# Patient Record
Sex: MALE | Race: WHITE | NOT HISPANIC OR LATINO | Employment: OTHER | URBAN - METROPOLITAN AREA
[De-identification: names, ages, dates, MRNs, and addresses within clinical notes are randomized per-mention and may not be internally consistent; named-entity substitution may affect disease eponyms.]

---

## 2021-01-15 DIAGNOSIS — Z23 NEED FOR VACCINATION: ICD-10-CM

## 2021-07-21 ENCOUNTER — HOSPITAL ENCOUNTER (OUTPATIENT)
Dept: RADIOLOGY | Facility: MEDICAL CENTER | Age: 76
End: 2021-07-21
Payer: COMMERCIAL

## 2021-12-11 ENCOUNTER — TELEPHONE (OUTPATIENT)
Dept: HEALTH INFORMATION MANAGEMENT | Facility: OTHER | Age: 76
End: 2021-12-11

## 2022-04-02 ENCOUNTER — TELEPHONE (OUTPATIENT)
Dept: HEALTH INFORMATION MANAGEMENT | Facility: OTHER | Age: 77
End: 2022-04-02
Payer: COMMERCIAL

## 2022-04-02 NOTE — TELEPHONE ENCOUNTER
Called to [facility] HEALTH ASSESSMENT. Elvia declined to verify HIPAA, he said it was not a good time to talk, after I told him the reason for the call.

## 2023-09-21 ENCOUNTER — TELEPHONE (OUTPATIENT)
Dept: HEALTH INFORMATION MANAGEMENT | Facility: OTHER | Age: 78
End: 2023-09-21

## 2023-12-11 ENCOUNTER — HOME HEALTH ADMISSION (OUTPATIENT)
Dept: HOME HEALTH SERVICES | Facility: HOME HEALTHCARE | Age: 78
End: 2023-12-11
Payer: COMMERCIAL

## 2024-04-24 ENCOUNTER — TELEPHONE (OUTPATIENT)
Dept: HEALTH INFORMATION MANAGEMENT | Facility: OTHER | Age: 79
End: 2024-04-24
Payer: COMMERCIAL

## 2024-09-16 PROBLEM — M75.121 COMPLETE ROTATOR CUFF TEAR OR RUPTURE OF RIGHT SHOULDER, NOT SPECIFIED AS TRAUMATIC: Status: ACTIVE | Noted: 2024-09-16

## 2025-06-22 ENCOUNTER — OFFICE VISIT (OUTPATIENT)
Dept: URGENT CARE | Facility: PHYSICIAN GROUP | Age: 80
End: 2025-06-22
Payer: MEDICARE

## 2025-06-22 VITALS
HEART RATE: 66 BPM | TEMPERATURE: 97 F | DIASTOLIC BLOOD PRESSURE: 64 MMHG | RESPIRATION RATE: 16 BRPM | OXYGEN SATURATION: 97 % | HEIGHT: 71 IN | BODY MASS INDEX: 30.52 KG/M2 | WEIGHT: 218.03 LBS | SYSTOLIC BLOOD PRESSURE: 106 MMHG

## 2025-06-22 DIAGNOSIS — H61.20 CERUMEN IN AUDITORY CANAL ON EXAMINATION: Primary | ICD-10-CM

## 2025-06-22 PROCEDURE — 99213 OFFICE O/P EST LOW 20 MIN: CPT

## 2025-06-22 PROCEDURE — 3078F DIAST BP <80 MM HG: CPT

## 2025-06-22 PROCEDURE — 3074F SYST BP LT 130 MM HG: CPT

## 2025-06-22 RX ORDER — COLCHICINE 0.6 MG/1
0.6 TABLET ORAL
COMMUNITY
Start: 2025-02-19

## 2025-06-22 RX ORDER — AMLODIPINE BESYLATE 10 MG/1
10 TABLET ORAL
COMMUNITY
Start: 2025-04-03

## 2025-06-22 RX ORDER — BENAZEPRIL HYDROCHLORIDE 10 MG/1
10 TABLET ORAL
COMMUNITY
Start: 2025-04-07

## 2025-06-22 ASSESSMENT — ENCOUNTER SYMPTOMS
CHILLS: 0
COUGH: 0
FEVER: 0

## 2025-06-23 NOTE — PROGRESS NOTES
CHIEF COMPLAINT  Chief Complaint   Patient presents with    Otalgia     X1 week left     Subjective:   Jake Prieto is a 79 y.o. male who presents to urgent care with concerns for left-sided ear pain and decreased hearing x 1 week.  Patient denies any symptoms of cough or congestion.  No fevers or chills.  Does report attempting to alleviate wax blockage with mineral oil.  He does endorse a history of tinnitus to his left ear and [facility].    Review of Systems   Constitutional:  Negative for chills and fever.   HENT:  Positive for ear pain, hearing loss and tinnitus. Negative for congestion and ear discharge.    Respiratory:  Negative for cough.        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    ALLERGIES  Allergies[1]    CURRENT MEDICATIONS  Home Medications       Reviewed by Chelsey Figueredo, Med Ass't (Medical Assistant) on 06/22/25 at 1837  Med List Status: <None>     Medication Last Dose Status   amLODIPine (NORVASC) 10 MG Tab Taking Active   amlodipine-benazepril (LOTREL) 5-10 MG per capsule Not Taking Flagged for Removal   benazepril (LOTENSIN) 10 MG Tab Taking Active   colchicine (COLCRYS) 0.6 MG Tab PRN Active   diclofenac sodium (VOLTAREN) 1 % Gel PRN Active   febuxostat (ULORIC) 40 MG Tab Taking Active   febuxostat (ULORIC)  "80 MG Tab Not Taking Flagged for Removal   meloxicam (MOBIC) 15 MG tablet Not Taking Flagged for Removal   traMADol (ULTRAM) 50 MG Tab Taking Active                    SOCIAL HISTORY  Social History     Tobacco Use    Smoking status: Never    Smokeless tobacco: Not on file   Vaping Use    Vaping status: Never Used   Substance and Sexual Activity    Alcohol use: No    Drug use: Yes     Types: Oral     Comment: Hemp gummies for sleep    Sexual activity: Not on file       FAMILY HISTORY  No family history on file.      Medications, Allergies, and current problem list reviewed today in Epic.     Objective:     /64 (BP Location: Right arm, Patient Position: Sitting, BP Cuff Size: Adult)   Pulse 66   Temp 36.1 °C (97 °F)   Resp 16   Ht 1.81 m (5' 11.25\")   Wt 98.9 kg (218 lb 0.6 oz)   SpO2 97%     Physical Exam  Vitals reviewed.   Constitutional:       General: He is not in acute distress.     Appearance: Normal appearance. He is not ill-appearing or toxic-appearing.   HENT:      Head: Normocephalic.      Right Ear: Tympanic membrane, ear canal and external ear normal. There is no impacted cerumen.      Left Ear: Tympanic membrane, ear canal and external ear normal. There is no impacted cerumen.      Nose: Nose normal.   Cardiovascular:      Rate and Rhythm: Normal rate.   Pulmonary:      Effort: Pulmonary effort is normal.   Neurological:      General: No focal deficit present.      Mental Status: He is alert.   Psychiatric:         Mood and Affect: Mood normal.         Assessment/Plan:     Diagnosis and associated orders:     1. Cerumen in auditory canal on examination           Comments/MDM:     Patient reports urgent care with 1 week history of ear pain as well as increased hearing loss to left ear.  No viral URI symptoms.  Does endorse a history of tinnitus.  Bilateral TMs are within defined limits.  External canal normal.  Small area of wax to 3:00 aspect of canal successfully removed with " lavage.  Advised patient on normal physical exam.  [facility].  Patient comfortable with plan.           Differential diagnosis, natural history, supportive care, and indications for immediate follow-up discussed.    Advised the patient to follow-up with the primary care physician for recheck, reevaluation, and consideration of further management.    Please note that this dictation was created using voice recognition software. I have made a reasonable attempt to correct obvious errors, but I expect that there are errors of grammar and possibly content that I did not discover before finalizing the note.    This note was electronically signed by DANIA Smith.       [1] No Known Allergies